# Patient Record
Sex: MALE | Race: WHITE | NOT HISPANIC OR LATINO | URBAN - METROPOLITAN AREA
[De-identification: names, ages, dates, MRNs, and addresses within clinical notes are randomized per-mention and may not be internally consistent; named-entity substitution may affect disease eponyms.]

---

## 2019-01-02 ENCOUNTER — OFFICE VISIT - RIVER FALLS (OUTPATIENT)
Dept: FAMILY MEDICINE | Facility: CLINIC | Age: 29
End: 2019-01-02

## 2019-01-02 ENCOUNTER — COMMUNICATION - RIVER FALLS (OUTPATIENT)
Dept: FAMILY MEDICINE | Facility: CLINIC | Age: 29
End: 2019-01-02

## 2019-01-02 LAB — DEPRECATED S PYO AG THROAT QL EIA: NOT DETECTED

## 2019-01-02 ASSESSMENT — MIFFLIN-ST. JEOR: SCORE: 1925.59

## 2019-01-07 LAB — BACTERIA SPEC CULT: NORMAL

## 2022-02-11 VITALS
HEIGHT: 69 IN | BODY MASS INDEX: 32.17 KG/M2 | HEART RATE: 107 BPM | OXYGEN SATURATION: 98 % | SYSTOLIC BLOOD PRESSURE: 110 MMHG | TEMPERATURE: 99.9 F | DIASTOLIC BLOOD PRESSURE: 70 MMHG | WEIGHT: 217.2 LBS

## 2022-02-15 NOTE — NURSING NOTE
Comprehensive Intake Entered On:  1/2/2019 12:43 PM CST    Performed On:  1/2/2019 12:39 PM CST by Elaina Hope MA   Chief Complaint :   Sore throat/fever for the past 2-3 days some coughing every once in a while, headaches   Weight Measured :   217.2 lb(Converted to: 217 lb 3 oz, 98.52 kg)    Height Measured :   69 in(Converted to: 5 ft 9 in, 175.26 cm)    Body Mass Index :   32.07 kg/m2 (HI)    Body Surface Area :   2.19 m2   Systolic Blood Pressure :   110 mmHg   Diastolic Blood Pressure :   70 mmHg   Mean Arterial Pressure :   83 mmHg   Peripheral Pulse Rate :   107 bpm (HI)    BP Site :   Right arm   Pulse Site :   Radial artery   Temperature Tympanic :   99.9 DegF(Converted to: 37.7 DegC)    Oxygen Saturation :   98 %   Elaina Hope MA - 1/2/2019 12:39 PM CST   Health Status   Allergies Verified? :   Yes   Medication History Verified? :   Yes   Immunizations Current :   Unknown   Medical History Verified? :   No   Pre-Visit Planning Status :   N/A   Tobacco Use? :   Former smoker   Elaina Hope MA - 1/2/2019 12:39 PM CST   Consents   Consent for Immunization Exchange :   Consent Granted   Consent for Immunizations to Providers :   Consent Granted   Elaina Hope MA - 1/2/2019 12:39 PM CST   Meds / Allergies   (As Of: 1/2/2019 12:43:57 PM CST)   Allergies (Active)   No known allergies  Estimated Onset Date:   Unspecified ; Created By:   Paige Aguayo CMA; Reaction Status:   Active ; Category:   Drug ; Substance:   No known allergies ; Type:   Allergy ; Updated By:   Paige Aguayo CMA; Reviewed Date:   1/29/2016 3:25 PM CST        Medication List   (As Of: 1/2/2019 12:43:57 PM CST)   No Known Home Medications     Elaina Hope MA - 1/2/2019 12:42:40 PM  
[id number]

## 2022-02-15 NOTE — PROGRESS NOTES
Patient:   CACHORRO MARTINEZ            MRN: [id number]            FIN: [id number]               Age:   28 years     Sex:  Male     :  1990   Associated Diagnoses:   Sore throat   Author:   Jack Jernigan MD      Chief Complaint   2019 12:39 PM CST    Sore throat/fever for the past 2-3 days some coughing every once in a while, headaches      History of Present Illness   see chief complaint as noted above and confirmed with the patient     28 year old male here today with complaint of not feeling well. On  night his throat began to hurt, when he woke up on Monday morning his throat hurt worse, it was hard to swallow, he has been having fevers, and headaches. He does cough once in a while. He denies nausea, denies vomiting, denies diarrhea, denies abdominal pain, and denies chest pains or pressures.       Review of Systems   Constitutional:  Fever, Chills.    Ear/Nose/Mouth/Throat:  Nasal congestion, Sore throat.    Respiratory:  Cough, No shortness of breath, No wheezing.    Cardiovascular:  No chest pain, No palpitations.    Gastrointestinal:  No nausea, No vomiting, No diarrhea, No abdominal pain.    Integumentary:  No rash.    Neurologic:  Alert and oriented X4, Headache.       Health Status   Allergies:    Allergic Reactions (Selected)  No known allergies   Medications:  (Selected)      Problem list:    All Problems  Tobacco user / SNOMED CT [id number] / Probable  Obese / ICD-9-.00 / Probable      Histories   Past Medical History:    No active or resolved past medical history items have been selected or recorded.   Family History:    Entire family history is negative.   Procedure history:    None ([id number]).   Social History:        Alcohol Assessment            Current, 3-5 times per week      Tobacco Assessment            Current every day smoker      Employment and Education Assessment            Employed, Work/School description: law enforcement (), [employer].      Home and Environment  Assessment            Marital status: Single.  Lives with Self, Roomate(s)/Friend(s).      Physical Examination   Vital Signs   1/2/2019 12:39 PM CST Temperature Tympanic 99.9 DegF    Peripheral Pulse Rate 107 bpm  HI    Pulse Site Radial artery    Systolic Blood Pressure 110 mmHg    Diastolic Blood Pressure 70 mmHg    Mean Arterial Pressure 83 mmHg    BP Site Right arm    Oxygen Saturation 98 %      Measurements from flowsheet : Measurements   1/2/2019 12:39 PM CST Height Measured - Standard 69 in    Weight Measured - Standard 217.2 lb    BSA 2.19 m2    Body Mass Index 32.07 kg/m2  HI      General:  Alert and oriented, No acute distress.    Eye:  Pupils are equal, round and reactive to light, Normal conjunctiva.    HENT:  Oral mucosa is moist.    Neck:  Supple.    Respiratory:  Respirations are non-labored.    Cardiovascular:  Normal rate, Regular rhythm, No edema.    Gastrointestinal:  Non-distended.    Musculoskeletal:  Normal gait.    Integumentary:  Warm, No rash.    Psychiatric:  Cooperative, Appropriate mood & affect, Normal judgment.       Review / Management   Results review:  Lab results: 1/2/2019 12:51 PM CST    Group A Strep POC         NOT DETECTED.       Impression and Plan       Diagnosis     Sore throat ([id number] J02.9).     Plan:  Discussed rapid results, informed culture will be done.     Most likely a virus, encouraged tylenol/ibuprofen for pain relief, drink plenty of fluids and get rest.     Work note for 48 hours of home rest. .    Elaina BLAKE Medical Assistant acted solely as a scribe for, and in presence of Dr. Jack Jernigan who performed the services.